# Patient Record
Sex: MALE | Race: BLACK OR AFRICAN AMERICAN | NOT HISPANIC OR LATINO | Employment: OTHER | URBAN - METROPOLITAN AREA
[De-identification: names, ages, dates, MRNs, and addresses within clinical notes are randomized per-mention and may not be internally consistent; named-entity substitution may affect disease eponyms.]

---

## 2017-01-19 RX ORDER — METOPROLOL TARTRATE 25 MG/1
25 TABLET, FILM COATED ORAL DAILY
Qty: 30 TABLET | Refills: 2 | Status: SHIPPED | OUTPATIENT
Start: 2017-01-19

## 2017-01-19 NOTE — TELEPHONE ENCOUNTER
----- Message from Mara Trevizo sent at 2017 12:53 PM CST -----  Contact: self  Dallin Gregg  MRN: [id number]  : 1950  PCP: Padmaja Bedolla  Home Phone      [phone]  Work Phone      Not on file.  [employer]          [phone]      MESSAGE:   Needs a refill on metoprolol    Phone:  [phone]    Pharmacy:  [facility] [address] in [place]

## 2017-01-24 ENCOUNTER — LAB VISIT (OUTPATIENT)
Dept: LAB | Facility: HOSPITAL | Age: 67
End: 2017-01-24
Attending: FAMILY MEDICINE
Payer: MEDICARE

## 2017-01-24 ENCOUNTER — OFFICE VISIT (OUTPATIENT)
Dept: FAMILY MEDICINE | Facility: CLINIC | Age: 67
End: 2017-01-24
Payer: MEDICARE

## 2017-01-24 VITALS
DIASTOLIC BLOOD PRESSURE: 80 MMHG | RESPIRATION RATE: 10 BRPM | BODY MASS INDEX: 31.12 KG/M2 | HEART RATE: 88 BPM | SYSTOLIC BLOOD PRESSURE: 120 MMHG | HEIGHT: 74 IN | WEIGHT: 242.5 LBS

## 2017-01-24 DIAGNOSIS — E11.36 TYPE 2 DIABETES MELLITUS WITH CATARACT: ICD-10-CM

## 2017-01-24 DIAGNOSIS — E11.69 DYSLIPIDEMIA ASSOCIATED WITH TYPE 2 DIABETES MELLITUS: ICD-10-CM

## 2017-01-24 DIAGNOSIS — E78.5 DYSLIPIDEMIA ASSOCIATED WITH TYPE 2 DIABETES MELLITUS: ICD-10-CM

## 2017-01-24 DIAGNOSIS — E11.36 TYPE 2 DIABETES MELLITUS WITH CATARACT: Primary | ICD-10-CM

## 2017-01-24 LAB
ALBUMIN SERPL BCP-MCNC: 4.1 G/DL
ALP SERPL-CCNC: 80 U/L
ALT SERPL W/O P-5'-P-CCNC: 41 U/L
ANION GAP SERPL CALC-SCNC: 10 MMOL/L
AST SERPL-CCNC: 33 U/L
BILIRUB SERPL-MCNC: 0.3 MG/DL
BUN SERPL-MCNC: 17 MG/DL
CALCIUM SERPL-MCNC: 9.9 MG/DL
CHLORIDE SERPL-SCNC: 105 MMOL/L
CHOLEST/HDLC SERPL: 3.3 {RATIO}
CO2 SERPL-SCNC: 27 MMOL/L
CREAT SERPL-MCNC: 1.1 MG/DL
EST. GFR  (AFRICAN AMERICAN): >60 ML/MIN/1.73 M^2
EST. GFR  (NON AFRICAN AMERICAN): >60 ML/MIN/1.73 M^2
GLUCOSE SERPL-MCNC: 84 MG/DL
HDL/CHOLESTEROL RATIO: 30.7 %
HDLC SERPL-MCNC: 137 MG/DL
HDLC SERPL-MCNC: 42 MG/DL
LDLC SERPL CALC-MCNC: 62 MG/DL
NONHDLC SERPL-MCNC: 95 MG/DL
POTASSIUM SERPL-SCNC: 4.1 MMOL/L
PROT SERPL-MCNC: 8.3 G/DL
SODIUM SERPL-SCNC: 142 MMOL/L
TRIGL SERPL-MCNC: 165 MG/DL

## 2017-01-24 PROCEDURE — 4010F ACE/ARB THERAPY RXD/TAKEN: CPT | Mod: S$GLB,,, | Performed by: FAMILY MEDICINE

## 2017-01-24 PROCEDURE — 80053 COMPREHEN METABOLIC PANEL: CPT

## 2017-01-24 PROCEDURE — 36415 COLL VENOUS BLD VENIPUNCTURE: CPT

## 2017-01-24 PROCEDURE — 3044F HG A1C LEVEL LT 7.0%: CPT | Mod: S$GLB,,, | Performed by: FAMILY MEDICINE

## 2017-01-24 PROCEDURE — 99499 UNLISTED E&M SERVICE: CPT | Mod: S$GLB,,, | Performed by: FAMILY MEDICINE

## 2017-01-24 PROCEDURE — 80061 LIPID PANEL: CPT

## 2017-01-24 PROCEDURE — 1159F MED LIST DOCD IN RCRD: CPT | Mod: S$GLB,,, | Performed by: FAMILY MEDICINE

## 2017-01-24 PROCEDURE — 99214 OFFICE O/P EST MOD 30 MIN: CPT | Mod: S$GLB,,, | Performed by: FAMILY MEDICINE

## 2017-01-24 PROCEDURE — 2022F DILAT RTA XM EVC RTNOPTHY: CPT | Mod: S$GLB,,, | Performed by: FAMILY MEDICINE

## 2017-01-24 PROCEDURE — 3079F DIAST BP 80-89 MM HG: CPT | Mod: S$GLB,,, | Performed by: FAMILY MEDICINE

## 2017-01-24 PROCEDURE — 3074F SYST BP LT 130 MM HG: CPT | Mod: S$GLB,,, | Performed by: FAMILY MEDICINE

## 2017-01-24 PROCEDURE — 83036 HEMOGLOBIN GLYCOSYLATED A1C: CPT

## 2017-01-24 PROCEDURE — 99999 PR PBB SHADOW E&M-EST. PATIENT-LVL III: CPT | Mod: PBBFAC,,, | Performed by: FAMILY MEDICINE

## 2017-01-24 RX ORDER — BLOOD SUGAR DIAGNOSTIC
STRIP MISCELLANEOUS
Qty: 100 EACH | Refills: 11 | Status: SHIPPED | OUTPATIENT
Start: 2017-01-24

## 2017-01-24 RX ORDER — BLOOD SUGAR DIAGNOSTIC
STRIP MISCELLANEOUS
Qty: 100 EACH | Refills: 11 | Status: SHIPPED | OUTPATIENT
Start: 2017-01-24 | End: 2017-01-24 | Stop reason: SDUPTHER

## 2017-01-24 NOTE — MR AVS SNAPSHOT
[place] [address] [place] [facility] [address]  Phone: [phone]  Fax: [phone]                  Dallin Gregg   2017 4:15 PM   Office Visit    Description:  Male : 1950   Provider:  Padmaja Bedolla MD   Department:  [facility]           Reason for Visit     Follow-up           Diagnoses this Visit        Comments    Type 2 diabetes mellitus with cataract    -  Primary     Dyslipidemia associated with type 2 diabetes mellitus                To Do List           Future Appointments        Provider Department Dept Phone    3/14/2017 8:00 AM Padmaja Bedolla MD [facility] [phone]      Goals (5 Years of Data)     None       These Medications        Disp Refills Start End    CONTOUR NEXT STRIPS Strp 100 each 2017     Check blood sugar as directed    Pharmacy: [facility] [address] [place] [phone] [address] [place] [facility] #: [phone]         [facility] On [facility]     [facility] On Call Nurse [facility] -  Assistance  Registered nurses in the [facility] On Call Center provide clinical advisement, health education, appointment booking, and other advisory services.  Call for this free service at [phone].             Medications           Message regarding Medications     Verify the changes and/or additions to your medication regime listed below are the same as discussed with your clinician today.  If any of these changes or additions are incorrect, please notify your healthcare provider.        CHANGE how you are taking these medications     Start Taking Instead of    CONTOUR NEXT STRIPS Strp CONTOUR NEXT STRIPS Strp    Dosage:  Check blood sugar as directed     Reason for Change:  Reorder            Verify that the below list of medications is an accurate representation of the medications you are currently taking.  If none reported, the list may be blank. If incorrect, please contact your healthcare provider.  "Carry this list with you in case of emergency.           Current Medications     BENICAR HCT 40-25 mg per tablet Take 1 tablet by mouth once daily.     CONTOUR NEXT STRIPS Strp Check blood sugar as directed    CRESTOR 20 mg tablet Take 1 tablet (20 mg total) by mouth once daily.    fluticasone (FLONASE) 50 mcg/actuation nasal spray 2 sprays by Each Nare route once daily.    glimepiride (AMARYL) 4 MG tablet Take 4 mg by mouth once daily.    hydrocodone-acetaminophen 5-325mg (NORCO) 5-325 mg per tablet Take 5-325 tablets by mouth as needed.    metformin (GLUCOPHAGE) 1000 MG tablet Take 1,000 mg by mouth 2 (two) times daily.    metoprolol tartrate (LOPRESSOR) 25 MG tablet Take 1 tablet (25 mg total) by mouth once daily.    nabumetone (RELAFEN) 500 MG tablet Take 500 mg by mouth once daily.    zolpidem (AMBIEN) 10 mg Tab Take 10 mg by mouth nightly as needed.           Clinical Reference Information           Vital Signs - Last Recorded  Most recent update: 1/24/2017  4:00 PM by Lily Saavedra MA    BP Pulse Resp Ht Wt BMI    120/80 88 10 6' 2" (1.88 m) 110 kg (242 lb 8.1 oz) 31.14 kg/m2      Blood Pressure          Most Recent Value    BP  120/80      Allergies as of 1/24/2017     No Known Allergies      Immunizations Administered on Date of Encounter - 1/24/2017     None      Orders Placed During Today's Visit     Future Labs/Procedures Expected by Expires    Comprehensive metabolic panel  1/24/2017 3/25/2018    Hemoglobin A1c  1/24/2017 3/25/2018    Lipid panel  1/24/2017 3/25/2018      [facility] Sign-Up     Activating your [facility] account is as easy as 1-2-3!     1) Visit [email or web address], select Sign Up Now, enter this activation code and your date of birth, then select Next.  [email or web address]  Expires: 3/10/2017  5:02 PM      2) Create a username and password to use when you visit [facility] in the future and select a security question in case you lose your password and select Next.    3) Enter your e-mail " address and click Sign Up!    Additional Information  If you have questions, please e-mail [email or web address] or call [phone] to talk to our [facility] staff. Remember, [facility] is NOT to be used for urgent needs. For medical emergencies, dial 911.

## 2017-01-25 ENCOUNTER — TELEPHONE (OUTPATIENT)
Dept: FAMILY MEDICINE | Facility: CLINIC | Age: 67
End: 2017-01-25

## 2017-01-25 RX ORDER — OLMESARTAN MEDOXOMIL AND HYDROCHLOROTHIAZIDE 40/12.5 40; 12.5 MG/1; MG/1
1 TABLET ORAL DAILY
Qty: 90 TABLET | Refills: 3 | Status: SHIPPED | OUTPATIENT
Start: 2017-01-25 | End: 2018-01-25

## 2017-01-25 RX ORDER — DEXTROSE 4 G
TABLET,CHEWABLE ORAL
Qty: 1 EACH | Refills: 0 | Status: SHIPPED | OUTPATIENT
Start: 2017-01-25

## 2017-01-25 RX ORDER — LANCETS
EACH MISCELLANEOUS
Qty: 100 EACH | Refills: 11 | Status: SHIPPED | OUTPATIENT
Start: 2017-01-25

## 2017-01-25 NOTE — PROGRESS NOTES
Subjective:       Patient ID: Dallin Gregg is a 66 y.o. male.    Chief Complaint: Follow-up    HPI  66 year old male comes in as his final appt prior to going back to his pcp. He says that he is upset with the change in insurance as he cannot get his glucometer or BP meds that he has been consistently taking for the last 8 years. He ran out of strips on Friday and has not checked his BS since then. He has had some low fasting sugars recently in the 70s. He has no symptoms with this. No other acute/new complaints.    PMH, PSH, ALLERGIES, SH, FH reviewed in nurse's notes above  Medications reconciled in the nurse's notes      Review of Systems   Constitutional: Negative for chills and fever.   HENT: Negative for congestion, ear pain, postnasal drip, rhinorrhea, sore throat and trouble swallowing.    Eyes: Negative for redness and itching.   Respiratory: Negative for cough, shortness of breath and wheezing.    Cardiovascular: Negative for chest pain and palpitations.   Gastrointestinal: Negative for abdominal pain, diarrhea, nausea and vomiting.   Genitourinary: Negative for dysuria and frequency.   Skin: Negative for rash.   Neurological: Negative for weakness and headaches.       Objective:      Physical Exam   Constitutional: He is oriented to person, place, and time. He appears well-developed. No distress.   HENT:   Head: Normocephalic and atraumatic.   Eyes: Conjunctivae are normal. Pupils are equal, round, and reactive to light.   Neck: Normal range of motion. Neck supple. No thyromegaly present.   Cardiovascular: Normal rate, regular rhythm, normal heart sounds and intact distal pulses.    Pulmonary/Chest: Effort normal and breath sounds normal. No respiratory distress. He has no wheezes.   Abdominal: Soft. Bowel sounds are normal. There is no tenderness.   Musculoskeletal: Normal range of motion. He exhibits no edema.   Lymphadenopathy:     He has no cervical adenopathy.   Neurological: He is alert and  oriented to person, place, and time.   Skin: Skin is warm and dry. No rash noted.   Psychiatric: He has a normal mood and affect. His behavior is normal.   Nursing note and vitals reviewed.         Results for orders placed or performed in visit on 06/14/16   Hemoglobin A1c   Result Value Ref Range    Hemoglobin A1C 6.7 (H) 4.5 - 6.2 %    Estimated Avg Glucose 146 (H) 68 - 131 mg/dL     Results for orders placed or performed in visit on 06/14/16   MICROALBUMIN / CREATININE RATIO URINE   Result Value Ref Range    Microalbum.,U,Random 14.0 ug/mL    Creatinine, Random Ur 216.0 23.0 - 375.0 mg/dL    Microalb Creat Ratio 6.5 0.0 - 30.0 ug/mg     Results for orders placed or performed in visit on 01/24/17   Lipid panel   Result Value Ref Range    Cholesterol 137 120 - 199 mg/dL    Triglycerides 165 (H) 30 - 150 mg/dL    HDL 42 40 - 75 mg/dL    LDL Cholesterol 62.0 (L) 63.0 - 159.0 mg/dL    HDL/Chol Ratio 30.7 20.0 - 50.0 %    Total Cholesterol/HDL Ratio 3.3 2.0 - 5.0    Non-HDL Cholesterol 95 mg/dL       Health Maintenance   Topic Date Due    Hepatitis C Screening  1950    Eye Exam  02/20/1961    TETANUS VACCINE  02/20/1969    Colonoscopy  02/20/2001    Zoster Vaccine  02/20/2011    Pneumococcal (65+) (1 of 2 - PCV13) 02/20/2016    Abdominal Aortic Aneurysm Screening  02/20/2016    Influenza Vaccine  08/01/2016    Hemoglobin A1c  12/14/2016    Foot Exam  09/14/2017    Lipid Panel  01/24/2018       Assessment/Plan:       Dallin was seen today for follow-up.    Diagnoses and all orders for this visit:    Type 2 diabetes mellitus with cataract  -     Comprehensive metabolic panel; Future  -     Hemoglobin A1c; Future    Dyslipidemia associated with type 2 diabetes mellitus  -     Lipid panel; Future    Other orders  -     Discontinue: CONTOUR NEXT STRIPS Strp; Check blood sugar as directed  -     CONTOUR NEXT STRIPS Strp; Check blood sugar as directed    RTC if condition acutely worsens or any other  concerns, otherwise RTC as scheduled

## 2017-01-25 NOTE — TELEPHONE ENCOUNTER
Insurance denied PA for Benicar HCT.    They will pay for the generic Olmesartan HCTZ. Please advise.

## 2017-01-26 LAB
ESTIMATED AVG GLUCOSE: 154 MG/DL
HBA1C MFR BLD HPLC: 7 %

## 2017-02-02 ENCOUNTER — TELEPHONE (OUTPATIENT)
Dept: FAMILY MEDICINE | Facility: CLINIC | Age: 67
End: 2017-02-02

## 2017-09-15 DIAGNOSIS — E11.9 TYPE 2 DIABETES MELLITUS WITHOUT COMPLICATION: ICD-10-CM

## 2018-06-18 DIAGNOSIS — Z12.11 COLON CANCER SCREENING: ICD-10-CM

## 2018-12-21 DIAGNOSIS — Z11.59 NEED FOR HEPATITIS C SCREENING TEST: ICD-10-CM

## 2021-07-29 ENCOUNTER — TELEPHONE (OUTPATIENT)
Dept: URGENT CARE | Facility: CLINIC | Age: 71
End: 2021-07-29

## 2021-07-29 ENCOUNTER — CLINICAL SUPPORT (OUTPATIENT)
Dept: URGENT CARE | Facility: CLINIC | Age: 71
End: 2021-07-29
Payer: MEDICARE

## 2021-07-29 DIAGNOSIS — Z11.59 SCREENING FOR VIRAL DISEASE: Primary | ICD-10-CM

## 2021-07-29 DIAGNOSIS — U07.1 COVID-19 VIRUS INFECTION: Primary | ICD-10-CM

## 2021-07-29 DIAGNOSIS — U07.1 COVID-19 VIRUS DETECTED: ICD-10-CM

## 2021-07-29 LAB
CTP QC/QA: YES
SARS-COV-2 RDRP RESP QL NAA+PROBE: POSITIVE

## 2021-07-29 PROCEDURE — U0002: ICD-10-PCS | Mod: QW,S$GLB,, | Performed by: PHYSICIAN ASSISTANT

## 2021-07-29 PROCEDURE — U0002 COVID-19 LAB TEST NON-CDC: HCPCS | Mod: QW,S$GLB,, | Performed by: PHYSICIAN ASSISTANT
